# Patient Record
Sex: MALE | Race: BLACK OR AFRICAN AMERICAN | Employment: STUDENT | URBAN - METROPOLITAN AREA
[De-identification: names, ages, dates, MRNs, and addresses within clinical notes are randomized per-mention and may not be internally consistent; named-entity substitution may affect disease eponyms.]

---

## 2018-06-15 ENCOUNTER — HOSPITAL ENCOUNTER (OUTPATIENT)
Dept: LAB | Age: 38
Discharge: HOME | End: 2018-06-15
Payer: COMMERCIAL

## 2018-06-15 ENCOUNTER — OFFICE VISIT (OUTPATIENT)
Dept: FAMILY MEDICINE CLINIC | Age: 38
End: 2018-06-15

## 2018-06-15 VITALS
WEIGHT: 128.4 LBS | HEART RATE: 94 BPM | RESPIRATION RATE: 18 BRPM | OXYGEN SATURATION: 97 % | BODY MASS INDEX: 20.63 KG/M2 | DIASTOLIC BLOOD PRESSURE: 65 MMHG | HEIGHT: 66 IN | SYSTOLIC BLOOD PRESSURE: 129 MMHG | TEMPERATURE: 97.7 F

## 2018-06-15 DIAGNOSIS — Z13.0 SCREENING FOR DEFICIENCY ANEMIA: ICD-10-CM

## 2018-06-15 DIAGNOSIS — J30.1 NON-SEASONAL ALLERGIC RHINITIS DUE TO POLLEN: ICD-10-CM

## 2018-06-15 DIAGNOSIS — Z13.29 SCREENING FOR THYROID DISORDER: ICD-10-CM

## 2018-06-15 DIAGNOSIS — Z13.21 ENCOUNTER FOR VITAMIN DEFICIENCY SCREENING: ICD-10-CM

## 2018-06-15 DIAGNOSIS — G57.32 NEUROPATHY OF LEFT PERONEAL NERVE: ICD-10-CM

## 2018-06-15 DIAGNOSIS — M79.2 NEUROPATHIC PAIN, LEG, LEFT: ICD-10-CM

## 2018-06-15 DIAGNOSIS — Z00.00 ROUTINE ADULT HEALTH MAINTENANCE: ICD-10-CM

## 2018-06-15 DIAGNOSIS — Z00.00 ROUTINE ADULT HEALTH MAINTENANCE: Primary | ICD-10-CM

## 2018-06-15 DIAGNOSIS — M21.372 FOOT DROP, LEFT: ICD-10-CM

## 2018-06-15 LAB
25(OH)D3 SERPL-MCNC: 11.4 NG/ML (ref 30–100)
ALBUMIN SERPL-MCNC: 4.1 G/DL (ref 3.4–5)
ALBUMIN/GLOB SERPL: 1.3 {RATIO} (ref 0.8–1.7)
ALP SERPL-CCNC: 69 U/L (ref 45–117)
ALT SERPL-CCNC: 25 U/L (ref 16–61)
ANION GAP SERPL CALC-SCNC: 8 MMOL/L (ref 3–18)
AST SERPL-CCNC: 24 U/L (ref 15–37)
BASOPHILS # BLD: 0 K/UL (ref 0–0.06)
BASOPHILS NFR BLD: 1 % (ref 0–2)
BILIRUB SERPL-MCNC: 0.9 MG/DL (ref 0.2–1)
BUN SERPL-MCNC: 12 MG/DL (ref 7–18)
BUN/CREAT SERPL: 15 (ref 12–20)
CALCIUM SERPL-MCNC: 8.8 MG/DL (ref 8.5–10.1)
CHLORIDE SERPL-SCNC: 104 MMOL/L (ref 100–108)
CO2 SERPL-SCNC: 29 MMOL/L (ref 21–32)
CREAT SERPL-MCNC: 0.8 MG/DL (ref 0.6–1.3)
DIFFERENTIAL METHOD BLD: ABNORMAL
EOSINOPHIL # BLD: 0.1 K/UL (ref 0–0.4)
EOSINOPHIL NFR BLD: 3 % (ref 0–5)
ERYTHROCYTE [DISTWIDTH] IN BLOOD BY AUTOMATED COUNT: 11.6 % (ref 11.6–14.5)
EST. AVERAGE GLUCOSE BLD GHB EST-MCNC: 108 MG/DL
FERRITIN SERPL-MCNC: 97 NG/ML (ref 8–388)
FOLATE SERPL-MCNC: >20 NG/ML (ref 3.1–17.5)
GLOBULIN SER CALC-MCNC: 3.1 G/DL (ref 2–4)
GLUCOSE SERPL-MCNC: 92 MG/DL (ref 74–99)
HBA1C MFR BLD: 5.4 % (ref 4.2–5.6)
HCT VFR BLD AUTO: 43.7 % (ref 36–48)
HGB BLD-MCNC: 15.1 G/DL (ref 13–16)
LYMPHOCYTES # BLD: 1.9 K/UL (ref 0.9–3.6)
LYMPHOCYTES NFR BLD: 42 % (ref 21–52)
MCH RBC QN AUTO: 31.5 PG (ref 24–34)
MCHC RBC AUTO-ENTMCNC: 34.6 G/DL (ref 31–37)
MCV RBC AUTO: 91.2 FL (ref 74–97)
MONOCYTES # BLD: 0.3 K/UL (ref 0.05–1.2)
MONOCYTES NFR BLD: 6 % (ref 3–10)
NEUTS SEG # BLD: 2.2 K/UL (ref 1.8–8)
NEUTS SEG NFR BLD: 48 % (ref 40–73)
PLATELET # BLD AUTO: 238 K/UL (ref 135–420)
PMV BLD AUTO: 11.3 FL (ref 9.2–11.8)
POTASSIUM SERPL-SCNC: 4.1 MMOL/L (ref 3.5–5.5)
PROT SERPL-MCNC: 7.2 G/DL (ref 6.4–8.2)
RBC # BLD AUTO: 4.79 M/UL (ref 4.7–5.5)
SODIUM SERPL-SCNC: 141 MMOL/L (ref 136–145)
T4 FREE SERPL-MCNC: 0.7 NG/DL (ref 0.7–1.5)
TSH SERPL DL<=0.05 MIU/L-ACNC: 0.4 UIU/ML (ref 0.36–3.74)
VIT B12 SERPL-MCNC: 638 PG/ML (ref 211–911)
WBC # BLD AUTO: 4.5 K/UL (ref 4.6–13.2)

## 2018-06-15 PROCEDURE — 83036 HEMOGLOBIN GLYCOSYLATED A1C: CPT | Performed by: FAMILY MEDICINE

## 2018-06-15 PROCEDURE — 80053 COMPREHEN METABOLIC PANEL: CPT | Performed by: FAMILY MEDICINE

## 2018-06-15 PROCEDURE — 36415 COLL VENOUS BLD VENIPUNCTURE: CPT | Performed by: FAMILY MEDICINE

## 2018-06-15 PROCEDURE — 82728 ASSAY OF FERRITIN: CPT | Performed by: FAMILY MEDICINE

## 2018-06-15 PROCEDURE — 85025 COMPLETE CBC W/AUTO DIFF WBC: CPT | Performed by: FAMILY MEDICINE

## 2018-06-15 PROCEDURE — 82607 VITAMIN B-12: CPT | Performed by: FAMILY MEDICINE

## 2018-06-15 PROCEDURE — 82306 VITAMIN D 25 HYDROXY: CPT | Performed by: FAMILY MEDICINE

## 2018-06-15 PROCEDURE — 84439 ASSAY OF FREE THYROXINE: CPT | Performed by: FAMILY MEDICINE

## 2018-06-15 PROCEDURE — 84443 ASSAY THYROID STIM HORMONE: CPT | Performed by: FAMILY MEDICINE

## 2018-06-15 RX ORDER — FEXOFENADINE HCL 60 MG
60 TABLET ORAL
Qty: 90 TAB | Refills: 1 | Status: SHIPPED | OUTPATIENT
Start: 2018-06-15 | End: 2019-01-25 | Stop reason: SDUPTHER

## 2018-06-15 RX ORDER — FLUTICASONE PROPIONATE 50 MCG
2 SPRAY, SUSPENSION (ML) NASAL DAILY
Qty: 1 BOTTLE | Refills: 1 | Status: SHIPPED | OUTPATIENT
Start: 2018-06-15

## 2018-06-15 RX ORDER — GABAPENTIN 300 MG/1
900 CAPSULE ORAL 3 TIMES DAILY
Qty: 270 CAP | Refills: 3 | Status: SHIPPED | OUTPATIENT
Start: 2018-06-15 | End: 2019-01-25 | Stop reason: SDUPTHER

## 2018-06-15 NOTE — PROGRESS NOTES
Sara Bingham is a 40 y.o. male (: 1980) presenting to address:    Chief Complaint   Patient presents with    Knee Pain     left knee     Leg Pain     left leg        Vitals:    06/15/18 1029   BP: 129/65   Pulse: 94   Resp: 18   Temp: 97.7 °F (36.5 °C)   TempSrc: Oral   SpO2: 97%   Weight: 128 lb 6.4 oz (58.2 kg)   Height: 5' 6\" (1.676 m)   PainSc:   7   PainLoc: Leg       Hearing/Vision:   No exam data present    Learning Assessment:     Learning Assessment 2013   PRIMARY LEARNER Patient   HIGHEST LEVEL OF EDUCATION - PRIMARY LEARNER  SOME COLLEGE   BARRIERS PRIMARY LEARNER NONE   PRIMARY LANGUAGE ENGLISH    NEED No   LEARNER PREFERENCE PRIMARY VIDEOS     LISTENING   ANSWERED BY Patient   RELATIONSHIP SELF     Depression Screening:     PHQ over the last two weeks 2016   Little interest or pleasure in doing things Nearly every day   Feeling down, depressed or hopeless Nearly every day   Total Score PHQ 2 6   Trouble falling or staying asleep, or sleeping too much Not at all   Feeling tired or having little energy Several days   Poor appetite or overeating Not at all   Feeling bad about yourself - or that you are a failure or have let yourself or your family down Not at all   Trouble concentrating on things such as school, work, reading or watching TV Not at all   Moving or speaking so slowly that other people could have noticed; or the opposite being so fidgety that others notice Not at all   Thoughts of being better off dead, or hurting yourself in some way Not at all   PHQ 9 Score 7   How difficult have these problems made it for you to do your work, take care of your home and get along with others Not difficult at all     Fall Risk Assessment:   No flowsheet data found. Abuse Screening:     Abuse Screening Questionnaire 2015   Do you ever feel afraid of your partner? N   Are you in a relationship with someone who physically or mentally threatens you?  N   Is it safe for you to go home? Y     Coordination of Care Questionaire:   1. Have you been to the ER, urgent care clinic since your last visit? Hospitalized since your last visit? YES Keysha [place] 11/9/17 Tongue Swelling; [facility] 5/25/18 Arm pain    2. Have you seen or consulted any other health care providers outside of the [address] [place] [address] since your last visit? Include any pap smears or colon screening. NO    Advanced Directive:   1. Do you have an Advanced Directive? NO    2. Would you like information on Advanced Directives?  NO

## 2018-06-15 NOTE — PROGRESS NOTES
3 [facility]  Primary Care Office Visit - Problem-Oriented    : 1980   Nba Linares is a 40 y.o. male presenting for  Chief Complaint   Patient presents with    Knee Pain     left knee     Leg Pain     left leg        Assessment/Plan:     1. Routine adult health maintenance  - HEMOGLOBIN A1C WITH EAG; Future  - CBC WITH AUTOMATED DIFF; Future  - METABOLIC PANEL, COMPREHENSIVE; Future  - T4, FREE; Future  - TSH 3RD GENERATION; Future  - VITAMIN D, 25 HYDROXY; Future    2. Screening for deficiency anemia  - HEMOGLOBIN A1C WITH EAG; Future  - VITAMIN B12 & FOLATE; Future  - FERRITIN; Future    3. Screening for thyroid disorder  - T4, FREE; Future  - TSH 3RD GENERATION; Future    4. Encounter for vitamin deficiency screening  - VITAMIN D, 25 HYDROXY; Future  - VITAMIN B12 & FOLATE; Future  - FERRITIN; Future    5. Neuropathic pain, leg, left  6. Neuropathy of left peroneal nerve  7. Foot drop, left  Chronic, ongoing. Restart gabapentin.  - gabapentin (NEURONTIN) 300 mg capsule; Take 3 Caps by mouth three (3) times daily. Dispense: 270 Cap; Refill: 3  - METABOLIC PANEL, COMPREHENSIVE; Future  - T4, FREE; Future  - TSH 3RD GENERATION; Future  - VITAMIN D, 25 HYDROXY; Future  - VITAMIN B12 & FOLATE; Future  - FERRITIN; Future    8. Non-seasonal allergic rhinitis due to pollen  New issue. Start nasal spray & antihistamine.  - fluticasone (FLONASE) 50 mcg/actuation nasal spray; 2 Sprays by Both Nostrils route daily. Dispense: 1 Bottle; Refill: 1  - fexofenadine (ALLEGRA) 60 mg tablet; Take 1 Tab by mouth daily as needed for Allergies. Dispense: 90 Tab; Refill: 1    Orders & Diagnoses associated with This Encounter:         ICD-10-CM ICD-9-CM   1. Routine adult health maintenance Z00.00 V70.0   2. Neuropathic pain, leg, left G57.92 355.8   3. Screening for deficiency anemia Z13.0 V78.1   4. Screening for thyroid disorder Z13.29 V77.0   5.  Encounter for vitamin deficiency screening Z13.21 V77. 99   6. Non-seasonal allergic rhinitis due to pollen J30.1 477.0       Orders Placed This Encounter    HEMOGLOBIN A1C WITH EAG     Standing Status:   Future     Number of Occurrences:   1     Standing Expiration Date:   2019    CBC WITH AUTOMATED DIFF     Standing Status:   Future     Number of Occurrences:   1     Standing Expiration Date:   3/44/6745    METABOLIC PANEL, COMPREHENSIVE     Standing Status:   Future     Number of Occurrences:   1     Standing Expiration Date:   2019    T4, FREE     Standing Status:   Future     Number of Occurrences:   1     Standing Expiration Date:   2019    TSH 3RD GENERATION     Standing Status:   Future     Number of Occurrences:   1     Standing Expiration Date:   2019    VITAMIN D, 25 HYDROXY     Standing Status:   Future     Number of Occurrences:   1     Standing Expiration Date:   2019    VITAMIN B12 & FOLATE     Standing Status:   Future     Number of Occurrences:   1     Standing Expiration Date:   2019    FERRITIN     Standing Status:   Future     Number of Occurrences:   1     Standing Expiration Date:   2019    gabapentin (NEURONTIN) 300 mg capsule     Sig: Take 3 Caps by mouth three (3) times daily. Dispense:  270 Cap     Refill:  3    fluticasone (FLONASE) 50 mcg/actuation nasal spray     Si Sprays by Both Nostrils route daily. Dispense:  1 Bottle     Refill:  1    fexofenadine (ALLEGRA) 60 mg tablet     Sig: Take 1 Tab by mouth daily as needed for Allergies. Dispense:  90 Tab     Refill:  1         This document may have been created with the aid of dictation software. Text may contain errors, particularly phonetic errors. Reviewed management plan & instructions with patient, who voiced understanding.     Kasey Daly MD  Internal Medicine, Family Medicine & Sports Medicine  6/15/2018, 10:46 AM      History:   Natasha Alvarado is a 40 y.o. male presenting to address:  Chief Complaint   Patient presents with    Knee Pain     left knee     Leg Pain     left leg        Working for [employer] since 2016. Calling off 1-2x/month 2/2 uncontrolled LLE pain associated with his previous injury. Wears his AFO about 3x/month. In general, plays the drums on Sunday, which does help with his neuromuscular rehabilitation, but occasionally leaves his pain uncontrolled the next day. Also reports sneezing, runny nose, resulting in non-productive cough. Past Medical History:   Diagnosis Date    Asthma      Past Surgical History:   Procedure Laterality Date    HX ORTHOPAEDIC  July 2013    ORIF L fibular head fracture      reports that he has never smoked. He has never used smokeless tobacco. He reports that he drinks about 3.0 oz of alcohol per week  He reports that he uses illicit drugs, including Marijuana. Social History     Social History Narrative    LHD    . 2 children. 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    (1/22/2015)     History   Smoking Status    Never Smoker   Smokeless Tobacco    Never Used     Family History   Problem Relation Age of Onset    Diabetes Mother     Cancer Maternal Grandmother      breast    Cancer Maternal Grandfather      prostate     Allergies   Allergen Reactions    Pcn [Penicillins] Other (comments)       Problem List:      Patient Active Problem List    Diagnosis    Vitamin D deficiency     - VitD 6.3 (L) (2/6/2016) -> 50k 2x/wk x12wks, then 4000 units OTC daily      Prediabetes     - A1c 5.8 (Feb 2016)      Routine health maintenance    Neuropathy of left peroneal nerve     - 2/25/2015 [[facility]]: schedule LLE NCS  - 1/28/2015 [[facility] PM&R]: butrans patch 10mcg weekly; continue gabapentin 1200mg TID & elavil 50mg qhs; start NMES unit for left leg atrophy    - EMG (12/2013): severe profound peroneal neuropathy at the level of the fibular head (short head of the biceps was spared).   The lack of reinnervation potentials at this point portends a poor prognosis.  Narcotic abuse     Per ER notes, using narcotic that he obtained off the streets      Foot drop, left    Fracture, fibula, proximal       Medications:     Current Outpatient Prescriptions   Medication Sig    VITAMIN D2 50,000 unit capsule TAKE 1 CAPSULE BY MOUTH EVERY TUESDAY AND FRIDAY    gabapentin (NEURONTIN) 300 mg capsule Take 3 Caps by mouth three (3) times daily.  HYDROcodone-acetaminophen (NORCO) 7.5-325 mg per tablet Take 1 Tab by mouth every four (4) hours as needed. No current facility-administered medications for this visit. Review of Systems:     Review of Systems   Constitutional: Negative for chills and fever. HENT: Positive for congestion. Negative for ear pain and sore throat. Respiratory: Positive for cough. Negative for shortness of breath. Cardiovascular: Negative for chest pain and palpitations. Gastrointestinal: Negative for abdominal pain. Genitourinary: Negative for dysuria. Musculoskeletal: Negative for myalgias. Skin: Negative for rash. Neurological: Positive for tingling, sensory change and focal weakness. Negative for speech change and headaches. Endo/Heme/Allergies: Does not bruise/bleed easily. Psychiatric/Behavioral: Negative for depression. The patient is not nervous/anxious and does not have insomnia. Physical Assessment:   VS:    Vitals:    06/15/18 1029   BP: 129/65   Pulse: 94   Resp: 18   Temp: 97.7 °F (36.5 °C)   TempSrc: Oral   SpO2: 97%   Weight: 128 lb 6.4 oz (58.2 kg)   Height: 5' 6\" (1.676 m)   PainSc:   7   PainLoc: Leg       Physical Exam   Constitutional: He is oriented to person, place, and time. He appears well-developed and well-nourished. No distress. HENT:   Head: Normocephalic and atraumatic. Right Ear: External ear normal.   Left Ear: External ear normal.   Mouth/Throat: Oropharynx is clear and moist.   Eyes: EOM are normal. Pupils are equal, round, and reactive to light.    Neck: Normal range of motion. Neck supple. Cardiovascular: Normal rate, regular rhythm and normal heart sounds. No murmur heard. Pulmonary/Chest: Effort normal and breath sounds normal. No respiratory distress. He has no wheezes. Musculoskeletal: Normal range of motion. Neurological: He is alert and oriented to person, place, and time. He displays atrophy. A sensory deficit is present. No cranial nerve deficit.   (+) left peroneal hypesthesia below knee  4/5 L ankle dorsiflexion with pain   Skin: Skin is warm and dry. He is not diaphoretic. Psychiatric: He has a normal mood and affect. His behavior is normal. Judgment and thought content normal.   Nursing note reviewed.

## 2018-06-24 DIAGNOSIS — E55.9 VITAMIN D DEFICIENCY: Primary | ICD-10-CM

## 2018-06-24 RX ORDER — ERGOCALCIFEROL 1.25 MG/1
50000 CAPSULE ORAL
Qty: 24 CAP | Refills: 0 | Status: SHIPPED | OUTPATIENT
Start: 2018-06-26 | End: 2019-04-18 | Stop reason: SDUPTHER

## 2018-06-24 NOTE — PATIENT INSTRUCTIONS
Neuropathic Pain: Care Instructions  Your Care Instructions    Neuropathic pain is caused by pressure on or damage to your nerves. It's often simply called nerve pain. Some people feel this type of pain all the time. For others, it comes and goes. Diabetes, shingles, or an injury can cause nerve pain. Many people say the pain feels sharp, burning, or stabbing. But some people feel it as a dull ache. In some cases, it makes your skin very sensitive. So touch, pressure, and other sensations that did not hurt before may now cause pain. It's important to know that this kind of pain is real and can affect your quality of life. It's also important to know that treatment can help. Treatment includes pain medicines, exercise, and physical therapy. Medicines can help reduce the number of pain signals that travel over the nerves. This can make the painful areas less sensitive. It can also help you sleep better and improve your mood. But medicines are only one part of successful treatment. Most people do best with more than one kind of treatment. Your doctor may recommend that you try cognitive-behavioral therapy and stress management. Or, if needed, you may decide to try to quit smoking, lower your blood pressure, or better control blood sugar. These kinds of healthy changes can also make a difference. If you feel that your treatment is not working, talk to your doctor. And be sure to tell your doctor if you think you might be depressed or anxious. These are common problems that can also be treated. Follow-up care is a key part of your treatment and safety. Be sure to make and go to all appointments, and call your doctor if you are having problems. It's also a good idea to know your test results and keep a list of the medicines you take. How can you care for yourself at home? · Be safe with medicines. Read and follow all instructions on the label.   ¨ If the doctor gave you a prescription medicine for pain, take it as prescribed. ¨ If you are not taking a prescription pain medicine, ask your doctor if you can take an over-the-counter medicine. · Save hard tasks for days when you have less pain. Follow a hard task with an easy task. And remember to take breaks. · Relax, and reduce stress. You may want to try deep breathing or meditation. These can help. · Keep moving. Gentle, daily exercise can help reduce pain. Your doctor or physical therapist can tell you what type of exercise is best for you. This may include walking, swimming, and stationary biking. It may also include stretches and range-of-motion exercises. · Try heat, cold packs, and massage. · Get enough sleep. Constant pain can make you more tired. If the pain makes it hard to sleep, talk with your doctor. · Think positively. Your thoughts can affect your pain. Do fun things to distract yourself from the pain. See a movie, read a book, listen to music, or spend time with a friend. · Keep a pain diary. Try to write down how strong your pain is and what it feels like. Also try to notice and write down how your moods, thoughts, sleep, activities, and medicine affect your pain. These notes can help you and your doctor find the best ways to treat your pain. Reducing constipation caused by pain medicine  Pain medicines often cause constipation. To reduce constipation:  · Include fruits, vegetables, beans, and whole grains in your diet each day. These foods are high in fiber. · Drink plenty of fluids, enough so that your urine is light yellow or clear like water. If you have kidney, heart, or liver disease and have to limit fluids, talk with your doctor before you increase the amount of fluids you drink. · Get some exercise every day. Build up slowly to 30 to 60 minutes a day on 5 or more days of the week. · Take a fiber supplement, such as Citrucel or Metamucil, every day if needed. Read and follow all instructions on the label.   · Schedule time each day for a bowel movement. Having a daily routine may help. Take your time and do not strain when having a bowel movement. · Ask your doctor about a laxative. The goal is to have one easy bowel movement every 1 to 2 days. Do not let constipation go untreated for more than 3 days. When should you call for help? Call your doctor now or seek immediate medical care if:  ? · You feel sad, anxious, or hopeless for more than a few days. This could mean you are depressed. Depression is common in people who have a lot of pain. But it can be treated. ? · You have trouble with bowel movements, such as:  ¨ No bowel movement in 3 days. ¨ Blood in the anal area, in your stool, or on the toilet paper. ¨ Diarrhea for more than 24 hours. ? Watch closely for changes in your health, and be sure to contact your doctor if:  ? · Your pain is getting worse. ? · You can't sleep because of pain. ? · You are very worried or anxious about your pain. ? · You have trouble taking your pain medicine. ? · You have any concerns about your pain medicine or its side effects. ? · You have vomiting or cramps for more than 2 hours. Where can you learn more? Go to http://tona-jose.info/. Enter H492 in the search box to learn more about \"Neuropathic Pain: Care Instructions. \"  Current as of: October 14, 2016  Content Version: 11.4  © 8475-5331 [employer]. Care instructions adapted under license by [employer] (which disclaims liability or warranty for this information). If you have questions about a medical condition or this instruction, always ask your healthcare professional. [address] any warranty or liability for your use of this information.

## 2018-06-25 NOTE — PROGRESS NOTES
Released to [facility] with the following msg:  ------  Your vitamin D level is quite low. This can cause fatigue & muscle pain. In order to restore your level to normal, I have sent high-dose prescription strength Vitamin D to your pharmacy, to be taken 2 times a week for 3 months. Once you are done with the 3mo prescription, you should start taking at least 2000 units of VitD3 over-the-counter on a daily basis to maintain your levels. Please let me know if you have any questions / concerns.

## 2019-01-14 ENCOUNTER — OFFICE VISIT (OUTPATIENT)
Dept: FAMILY MEDICINE CLINIC | Age: 39
End: 2019-01-14

## 2019-01-14 ENCOUNTER — HOSPITAL ENCOUNTER (OUTPATIENT)
Dept: LAB | Age: 39
Discharge: HOME | End: 2019-01-14
Payer: COMMERCIAL

## 2019-01-14 VITALS
DIASTOLIC BLOOD PRESSURE: 80 MMHG | OXYGEN SATURATION: 98 % | SYSTOLIC BLOOD PRESSURE: 118 MMHG | HEIGHT: 66 IN | BODY MASS INDEX: 22.24 KG/M2 | TEMPERATURE: 98.4 F | HEART RATE: 87 BPM | RESPIRATION RATE: 16 BRPM | WEIGHT: 138.4 LBS

## 2019-01-14 DIAGNOSIS — R36.0 PENILE DISCHARGE, WITHOUT BLOOD: ICD-10-CM

## 2019-01-14 DIAGNOSIS — R36.0 PENILE DISCHARGE, WITHOUT BLOOD: Primary | ICD-10-CM

## 2019-01-14 LAB
APPEARANCE UR: CLEAR
BACTERIA URNS QL MICRO: NEGATIVE /HPF
BILIRUB UR QL: NEGATIVE
COLOR UR: YELLOW
EPITH CASTS URNS QL MICRO: NORMAL /LPF (ref 0–5)
GLUCOSE UR STRIP.AUTO-MCNC: NEGATIVE MG/DL
HGB UR QL STRIP: NEGATIVE
KETONES UR QL STRIP.AUTO: NEGATIVE MG/DL
LEUKOCYTE ESTERASE UR QL STRIP.AUTO: ABNORMAL
NITRITE UR QL STRIP.AUTO: NEGATIVE
PH UR STRIP: 6.5 [PH] (ref 5–8)
PROT UR STRIP-MCNC: NEGATIVE MG/DL
RBC #/AREA URNS HPF: 0 /HPF (ref 0–5)
SP GR UR REFRACTOMETRY: 1 (ref 1–1.03)
UROBILINOGEN UR QL STRIP.AUTO: 0.2 EU/DL (ref 0.2–1)
WBC URNS QL MICRO: NORMAL /HPF (ref 0–4)

## 2019-01-14 PROCEDURE — 81001 URINALYSIS AUTO W/SCOPE: CPT

## 2019-01-14 PROCEDURE — 87491 CHLMYD TRACH DNA AMP PROBE: CPT

## 2019-01-14 PROCEDURE — 87086 URINE CULTURE/COLONY COUNT: CPT

## 2019-01-14 NOTE — PROGRESS NOTES
HISTORY OF PRESENT ILLNESS Kevin Kurtz is a 41 y.o. male. Penile Discharge The history is provided by the patient and medical records. This is a new problem. Episode onset: about a week ago. Pertinent negatives include no abdominal pain. Patient Active Problem List  
Diagnosis Code  Foot drop, left M21.372  Fracture, fibula, proximal X22.425Y  Neuropathy of left peroneal nerve G57.32  
 Routine health maintenance Z00.00  Vitamin D deficiency E55.9  Prediabetes R73.03  
 
 
Current Outpatient Medications:  
  gabapentin (NEURONTIN) 300 mg capsule, Take 3 Caps by mouth three (3) times daily. , Disp: 270 Cap, Rfl: 3 
  fluticasone (FLONASE) 50 mcg/actuation nasal spray, 2 Sprays by Both Nostrils route daily. , Disp: 1 Bottle, Rfl: 1 
  fexofenadine (ALLEGRA) 60 mg tablet, Take 1 Tab by mouth daily as needed for Allergies. , Disp: 90 Tab, Rfl: 1 Allergies Allergen Reactions  Pcn [Penicillins] Other (comments) Review of Systems Constitutional: Negative for chills and fever. Gastrointestinal: Negative for abdominal pain. Genitourinary: Negative for dysuria, flank pain, frequency, hematuria and urgency. Some itching with urination Small amount of discharge at urethral OS No staining on shorts Visit Vitals /80 (BP 1 Location: Left arm, BP Patient Position: Sitting) Pulse 87 Temp 98.4 °F (36.9 °C) (Oral) Resp 16 Ht 5' 6\" (1.676 m) Wt 138 lb 6.4 oz (62.8 kg) SpO2 98% BMI 22.34 kg/m² Physical Exam  
Constitutional: He is oriented to person, place, and time. He appears well-developed and well-nourished. HENT:  
Head: Normocephalic. Eyes: EOM are normal.  
Neck: Neck supple. Cardiovascular: Normal rate, regular rhythm and normal heart sounds. Pulmonary/Chest: Effort normal and breath sounds normal.  
Genitourinary: Penis normal.  
Musculoskeletal: He exhibits no edema. Neurological: He is alert and oriented to person, place, and time. Skin: Skin is warm and dry. Psychiatric: He has a normal mood and affect. His behavior is normal.  
Nursing note and vitals reviewed. ASSESSMENT and PLAN 
  ICD-10-CM ICD-9-CM 1. Penile discharge, without blood R36.9 788.7 URINALYSIS W/ RFLX MICROSCOPIC  
   CULTURE, URINE  
   CHLAMYDIA/NEISSERIA AMPLIFICATION Further disposition pending lab results if indicated.

## 2019-01-14 NOTE — PATIENT INSTRUCTIONS
Further disposition pending lab results if indicated. Gonorrhea and Chlamydia: About These Tests What is it? You can have a test to find out if you have gonorrhea or chlamydia. This kind of test checks for the bacteria that cause these sexually transmitted infections (STIs). There are different ways to test for the bacteria. Most tests use either a urine sample or a sample of body fluid. A fluid sample can come from inside the tip of the penis or from inside the rectum or vagina. Why is this test done? These tests may be done to: · Find out if your symptoms are caused by gonorrhea or chlamydia. · Find out if you have one of these infections even though you don't have symptoms. How can you prepare for the test? 
· If you are a woman, do not douche or use vaginal creams or medicines for at least 24 hours before the test. 
· If you are going to have a urine test, do not urinate for 2 hours before the test. 
What happens during the test? 
· To get a sample from the urethra or rectum, the doctor will put a small swab into the tip of the penis or inside the rectum. · To get a sample from the vagina, the doctor will first put a speculum into the vagina. A speculum is a tool to spread apart the walls of the vagina. Then he or she will use a small swab to get the fluid sample. · For a urine sample, you will collect the urine that comes out when you first start to urinate. Don't wipe the genital area clean before you urinate. What else should you know about the test? 
· If you think you may have chlamydia or gonorrhea, don't have sexual intercourse until you get your test results. And you may want to have tests for other STIs, such as HIV. · If you do have an infection, don't have sexual intercourse for 7 days after you start treatment. · If you have an infection, your sex partner(s) should also be treated. How long does the test take? · The test will take a few minutes.  
What happens after the test? 
 · You will be able to go home right away. · You can go back to your usual activities right away. Follow-up care is a key part of your treatment and safety. Be sure to make and go to all appointments, and call your doctor if you are having problems. It's also a good idea to keep a list of the medicines you take. Ask your doctor when you can expect to have your test results. Where can you learn more? Go to http://tona-jose.info/. Enter P028 in the search box to learn more about \"Gonorrhea and Chlamydia: About These Tests. \" Current as of: November 27, 2017 Content Version: 11.8 © 4018-7948 [employer]. Care instructions adapted under license by [employer] (which disclaims liability or warranty for this information). If you have questions about a medical condition or this instruction, always ask your healthcare professional. [address] any warranty or liability for your use of this information. Urine Culture: About This Test 
What is it? A urine culture is a test to find germs (such as bacteria) that can cause an infection. A sample of urine is added to a substance that promotes the growth of germs. If no germs grow, the culture is negative. If germs that can cause infection grow, the culture is positive. The type of germ may be identified using a microscope or chemical tests. Why is this test done? A urine culture may be done to: · Find the cause of a urinary tract infection (UTI). · Make decisions about the best treatment for a UTI. · Find out whether treatment for a UTI worked. How can you prepare for the test? 
You don't need to do anything before you have the test. If you are taking or have recently taken antibiotics, tell your doctor. What happens before the test? 
You will need to drink enough fluids and avoid urinating so that you will be able to collect a urine sample.  
What happens during the test? 
 You will be asked to collect a clean-catch midstream urine sample for testing. The first urine of the day is best because bacteria levels will be higher. · Wash your hands before collecting the urine. · If the container has a lid, remove the lid of the container and set it down with the inner surface up. · Clean the area around your penis or vagina. · Begin urinating into the toilet or urinal. 
· After the urine has flowed for several seconds, place the collection container in the stream and collect about 2 ounces (a quarter cup) of this \"midstream\" urine without stopping the flow. · Don't touch the rim of the container to your genital area. · Finish urinating into the toilet or urinal. 
· Carefully replace the lid on the container. · Wash your hands. How long does the test take? · The test will take a few minutes. What happens after the test? 
· You will probably be able to go home right away. The results of a urine culture are usually available in 1 to 3 days. · You can go back to your usual activities right away. Follow-up care is a key part of your treatment and safety. Be sure to make and go to all appointments, and call your doctor if you are having problems. It's also a good idea to keep a list of the medicines you take. Ask your doctor when you can expect to have your test results. Where can you learn more? Go to http://tona-jose.info/. Enter Q960 in the search box to learn more about \"Urine Culture: About This Test.\" Current as of: June 26, 2018 Content Version: 11.8 © 7574-2683 [employer]. Care instructions adapted under license by [employer] (which disclaims liability or warranty for this information). If you have questions about a medical condition or this instruction, always ask your healthcare professional. [address] any warranty or liability for your use of this information.

## 2019-01-14 NOTE — PROGRESS NOTES
Eleno Ibrahim is a 41 y.o. male (: 1980) presenting to address: Chief Complaint Patient presents with  
 Other Here for burning and itching in private area x 1 week. Also need medication refill. There were no vitals filed for this visit. Hearing/Vision: No exam data present Learning Assessment:  
 
Learning Assessment 2013 PRIMARY LEARNER Patient HIGHEST LEVEL OF EDUCATION - PRIMARY LEARNER  SOME COLLEGE  
BARRIERS PRIMARY LEARNER NONE PRIMARY LANGUAGE ENGLISH  NEED No  
LEARNER PREFERENCE PRIMARY VIDEOS  
  LISTENING  
ANSWERED BY Patient RELATIONSHIP SELF Depression Screening: PHQ over the last two weeks 2019 Little interest or pleasure in doing things Not at all Feeling down, depressed, irritable, or hopeless Not at all Total Score PHQ 2 0 Trouble falling or staying asleep, or sleeping too much - Feeling tired or having little energy - Poor appetite, weight loss, or overeating - Feeling bad about yourself - or that you are a failure or have let yourself or your family down - Trouble concentrating on things such as school, work, reading, or watching TV - Moving or speaking so slowly that other people could have noticed; or the opposite being so fidgety that others notice - Thoughts of being better off dead, or hurting yourself in some way -  
PHQ 9 Score - How difficult have these problems made it for you to do your work, take care of your home and get along with others - Fall Risk Assessment:  
No flowsheet data found. Abuse Screening:  
 
Abuse Screening Questionnaire 2015 Do you ever feel afraid of your partner? Tyrell Moore Are you in a relationship with someone who physically or mentally threatens you? Tyrell Moore Is it safe for you to go home? Tania Solomon Coordination of Care Questionaire: 1. Have you been to the ER, urgent care clinic since your last visit? Hospitalized since your last visit?  NO 
 
 2. Have you seen or consulted any other health care providers outside of the [facility] since your last visit? Include any pap smears or colon screening. NO Advanced Directive: 1. Do you have an Advanced Directive? NO 
 
2. Would you like information on Advanced Directives?  NO

## 2019-01-15 LAB
C TRACH RRNA SPEC QL NAA+PROBE: POSITIVE
N GONORRHOEA RRNA SPEC QL NAA+PROBE: POSITIVE
SPECIMEN SOURCE: ABNORMAL

## 2019-01-16 ENCOUNTER — CLINICAL SUPPORT (OUTPATIENT)
Dept: FAMILY MEDICINE CLINIC | Age: 39
End: 2019-01-16

## 2019-01-16 DIAGNOSIS — A74.9 CHLAMYDIA INFECTION: ICD-10-CM

## 2019-01-16 DIAGNOSIS — A54.01 GONOCOCCAL URETHRITIS IN MALE: Primary | ICD-10-CM

## 2019-01-16 LAB
BACTERIA SPEC CULT: NORMAL
SERVICE CMNT-IMP: NORMAL

## 2019-01-16 RX ORDER — CEFTRIAXONE 250 MG/8ML
250 INJECTION, POWDER, FOR SOLUTION INTRAMUSCULAR; INTRAVENOUS ONCE
Qty: 1 VIAL | Refills: 0
Start: 2019-01-16 | End: 2019-01-16

## 2019-01-16 RX ORDER — AZITHROMYCIN 250 MG/1
TABLET, FILM COATED ORAL
Qty: 4 TAB | Refills: 0 | Status: SHIPPED | OUTPATIENT
Start: 2019-01-16 | End: 2019-01-21

## 2019-01-16 NOTE — PROGRESS NOTES
After obtaining consent, and per orders of Dr. Felicita Barriga, injection of Ceftriaxone given by Caryn Lara LPN. Patient instructed to remain in clinic for 20 minutes afterwards, and to report any adverse reaction to me immediately.

## 2019-01-16 NOTE — PROGRESS NOTES
Please advise . Lacie Schumacher that his lab results are positive for chlamydia and gonorrhea. Treatment requires an antibiotic injection and oral antibiotics. Please advise him to return for a nurse visit ASAP for an injection with ceftriaxone and to receive a prescription for the oral antibiotic.

## 2019-01-24 NOTE — PROGRESS NOTES
[facility] Visit - Problem-Oriented : 1980 Viviane Foster is a 41 y.o. male presenting for Chief Complaint Patient presents with  Complete Physical  
 Leg Pain  
  left leg pain Assessment/Plan: 1. Neuropathic pain, leg, left 2. Neuropathy of left peroneal nerve 3. Foot drop, left Ongoing neuropathy, with changes in quality of pain, now both radiating down leg as well as going up leg. Add cymbalta. No change to current gabapentin. Referral for consultation. 
- gabapentin (NEURONTIN) 300 mg capsule; Take 3 Caps by mouth three (3) times daily. Dispense: 270 Cap; Refill: 3 
- DULoxetine (CYMBALTA) 20 mg capsule; Take 1 Cap by mouth daily. Dispense: 30 Cap; Refill: 2 
- REFERRAL TO NEUROLOGY 4. Non-seasonal allergic rhinitis due to pollen Initial encounter. Start antihistamine. 
- fexofenadine (ALLEGRA) 60 mg tablet; Take 1 Tab by mouth daily as needed for Allergies. Dispense: 90 Tab; Refill: 1 5. Penile discharge Notes resolution s/p tx for GC and Chl. 
Desires retesting to ensure cure. 
- CT/NG/T.VAGINALIS AMPLIFICATION; Future Orders & Diagnoses associated with This Encounter: ICD-10-CM ICD-9-CM 1. Neuropathic pain, leg, left G57.92 355.8 2. Neuropathy of left peroneal nerve G57.32 355.3 3. Foot drop, left M21.372 736.79  
4. Non-seasonal allergic rhinitis due to pollen J30.1 477.0 5. Penile discharge R36.9 788.7 Orders Placed This Encounter  
 CT/NG/T.VAGINALIS AMPLIFICATION Standing Status:   Future Number of Occurrences:   1 Standing Expiration Date:   2020 Order Specific Question:   Specimen type Answer:   Urine, random [581]  REFERRAL TO NEUROLOGY Referral Priority:   Routine Referral Type:   Consultation Referral Reason:   Specialty Services Required Referred to Provider:   Samuel Zhou DO  
  Number of Visits Requested:   1  gabapentin (NEURONTIN) 300 mg capsule Sig: Take 3 Caps by mouth three (3) times daily. Dispense:  270 Cap Refill:  3  
 fexofenadine (ALLEGRA) 60 mg tablet Sig: Take 1 Tab by mouth daily as needed for Allergies. Dispense:  90 Tab Refill:  1  DULoxetine (CYMBALTA) 20 mg capsule Sig: Take 1 Cap by mouth daily. Dispense:  30 Cap Refill:  2 This document may have been created with the aid of dictation software. Text may contain errors, particularly phonetic errors. Reviewed management plan & instructions with patient, who voiced understanding. Sal Serra MD 
Internal Medicine, Family Medicine & Sports Medicine 1/25/2019 Patient Instructions (provided in AVS): To Do: 
· In order to maintain your vitamin D level in the normal range,  I recommend that you take at least 6000 units of Vitamin D3 over-the-counter on a daily basis. ·  to try to get a better control of your nerve pain, add the cymbalta 20mg daily to your regimen. Try taking this at night before bed since sometimes it can make people sleepy in the beginning, but as you take it regularly, it gets better. · Be sure to take your gabapentin 900mg 3 times a day on a regular basis. .. Because it works better when you take it regularly! Notes from your doctor: · I'm going to work on getting you to a neurologist to reassess your nerve function in your left leg, to ensure that we aren't missing anything that could be worsening your pain History:  
Parris Brown is a 41 y.o. male presenting to address: Chief Complaint Patient presents with  Complete Physical  
 Leg Pain  
  left leg pain Still working for [employer]. At times, both legs seem to give out, and has changes in his nerve pain on the left. The pain of LLE not only just going down leg, but also going up leg. Is interested in pursuing further consultation as he would like to improve his QOL. Also states that he did complete the treatment for the gonorrhea/chlamydia for which he was diagnosed during his last visit in this office (with other physician). Notes resolution of  symptoms, and did inform his sexual partners to ensure they were treated. Past Medical History:  
Diagnosis Date  Asthma  Narcotic abuse ([address] 10/13/2014 Per ER notes, using narcotic that he obtained off the streets Past Surgical History:  
Procedure Laterality Date [place] [facility]  July 2013 ORIF L fibular head fracture  
 
 reports that  has never smoked. he has never used smokeless tobacco. He reports that he drinks about 3.0 oz of alcohol per week. He reports that he uses drugs. Drug: Marijuana. Social History Social History Narrative LHD . 2 children. 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  
 (1/22/2015) Social History Tobacco Use Smoking Status Never Smoker Smokeless Tobacco Never Used Family History Problem Relation Age of Onset  Diabetes Mother  Cancer Maternal Grandmother   
     breast  
 Cancer Maternal Grandfather   
     prostate Allergies Allergen Reactions  Pcn [Penicillins] Other (comments) Problem List:  
  
Patient Active Problem List  
 Diagnosis  Penile discharge, without blood  Vitamin D deficiency - VitD 6.3 (L) (2/6/2016) -> 50k 2x/wk x12wks, then 4000 units OTC daily  Prediabetes - A1c 5.8 (Feb 2016)  Routine health maintenance  Neuropathy of left peroneal nerve - 2/25/2015 [[facility]]: schedule LLE NCS 
- 1/28/2015 [[facility] PM&R]: butrans patch 10mcg weekly; continue gabapentin 1200mg TID & elavil 50mg qhs; start NMES unit for left leg atrophy 
 
- EMG (12/2013): severe profound peroneal neuropathy at the level of the fibular head (short head of the biceps was spared). The lack of reinnervation potentials at this point portends a poor prognosis.  Foot drop, left  Fracture, fibula, proximal  
 
 
Medications:  
 
Current Outpatient Medications Medication Sig  
 gabapentin (NEURONTIN) 300 mg capsule Take 3 Caps by mouth three (3) times daily.  fluticasone (FLONASE) 50 mcg/actuation nasal spray 2 Sprays by Both Nostrils route daily.  fexofenadine (ALLEGRA) 60 mg tablet Take 1 Tab by mouth daily as needed for Allergies. No current facility-administered medications for this visit. Review of Systems:  
 
Review of Systems Constitutional: Negative for chills and fever. HENT: Negative for congestion, ear pain and sore throat. Respiratory: Negative for cough and shortness of breath. Cardiovascular: Negative for chest pain and palpitations. Gastrointestinal: Negative for abdominal pain. Genitourinary: Negative for dysuria. Musculoskeletal: Negative for myalgias. Skin: Negative for rash. Neurological: Positive for tingling, sensory change and focal weakness. Negative for speech change and headaches. Endo/Heme/Allergies: Does not bruise/bleed easily. Psychiatric/Behavioral: Negative for depression. The patient is not nervous/anxious and does not have insomnia. Physical Assessment:  
VS:   
Vitals:  
 01/25/19 1041 BP: 124/81 Pulse: 84 Resp: 18 Temp: 97.7 °F (36.5 °C) TempSrc: Oral  
SpO2: 100% Weight: 137 lb 9.6 oz (62.4 kg) Height: 5' 6\" (1.676 m) PainSc:   8 PainLoc: Leg Physical Exam  
Constitutional: He is oriented to person, place, and time. He appears well-developed and well-nourished. No distress. HENT:  
Head: Normocephalic and atraumatic. Right Ear: External ear normal.  
Left Ear: External ear normal.  
Mouth/Throat: Oropharynx is clear and moist.  
Eyes: EOM are normal. Pupils are equal, round, and reactive to light. Neck: Normal range of motion. Neck supple. Cardiovascular: Normal rate, regular rhythm and normal heart sounds. No murmur heard. Pulmonary/Chest: Effort normal and breath sounds normal. No respiratory distress. He has no wheezes. Musculoskeletal: Normal range of motion. Neurological: He is alert and oriented to person, place, and time. He displays atrophy. A sensory deficit is present. No cranial nerve deficit.  
(+) left peroneal hypesthesia below knee 4/5 L ankle dorsiflexion with pain Skin: Skin is warm and dry. He is not diaphoretic. Psychiatric: He has a normal mood and affect. His behavior is normal. Judgment and thought content normal.  
Nursing note reviewed.

## 2019-01-25 ENCOUNTER — OFFICE VISIT (OUTPATIENT)
Dept: FAMILY MEDICINE CLINIC | Age: 39
End: 2019-01-25

## 2019-01-25 VITALS
HEART RATE: 84 BPM | OXYGEN SATURATION: 100 % | TEMPERATURE: 97.7 F | DIASTOLIC BLOOD PRESSURE: 81 MMHG | BODY MASS INDEX: 22.11 KG/M2 | WEIGHT: 137.6 LBS | HEIGHT: 66 IN | RESPIRATION RATE: 18 BRPM | SYSTOLIC BLOOD PRESSURE: 124 MMHG

## 2019-01-25 DIAGNOSIS — G57.32 NEUROPATHY OF LEFT PERONEAL NERVE: ICD-10-CM

## 2019-01-25 DIAGNOSIS — M21.372 FOOT DROP, LEFT: ICD-10-CM

## 2019-01-25 DIAGNOSIS — M79.2 NEUROPATHIC PAIN, LEG, LEFT: Primary | ICD-10-CM

## 2019-01-25 DIAGNOSIS — J30.1 NON-SEASONAL ALLERGIC RHINITIS DUE TO POLLEN: ICD-10-CM

## 2019-01-25 DIAGNOSIS — R36.9 PENILE DISCHARGE: ICD-10-CM

## 2019-01-25 RX ORDER — GABAPENTIN 300 MG/1
900 CAPSULE ORAL 3 TIMES DAILY
Qty: 270 CAP | Refills: 3 | Status: SHIPPED | OUTPATIENT
Start: 2019-01-25 | End: 2019-11-27

## 2019-01-25 RX ORDER — FEXOFENADINE HCL 60 MG
60 TABLET ORAL
Qty: 90 TAB | Refills: 1 | Status: SHIPPED | OUTPATIENT
Start: 2019-01-25

## 2019-01-25 RX ORDER — DULOXETIN HYDROCHLORIDE 20 MG/1
20 CAPSULE, DELAYED RELEASE ORAL DAILY
Qty: 30 CAP | Refills: 2 | Status: SHIPPED | OUTPATIENT
Start: 2019-01-25 | End: 2019-03-18 | Stop reason: SDUPTHER

## 2019-01-25 NOTE — PATIENT INSTRUCTIONS
To Do: 
· In order to maintain your vitamin D level in the normal range,  I recommend that you take at least 6000 units of Vitamin D3 over-the-counter on a daily basis. ·  to try to get a better control of your nerve pain, add the cymbalta 20mg daily to your regimen. Try taking this at night before bed since sometimes it can make people sleepy in the beginning, but as you take it regularly, it gets better. · Be sure to take your gabapentin 900mg 3 times a day on a regular basis. .. Because it works better when you take it regularly! Notes from your doctor: · I'm going to work on getting you to a neurologist to reassess your nerve function in your left leg, to ensure that we aren't missing anything that could be worsening your pain

## 2019-01-25 NOTE — PROGRESS NOTES
Sara Bingham is a 41 y.o. male (: 1980) presenting to address: Chief Complaint Patient presents with  Complete Physical  
 Leg Pain  
  left leg pain Vitals:  
 19 1041 BP: 124/81 Pulse: 84 Resp: 18 Temp: 97.7 °F (36.5 °C) TempSrc: Oral  
SpO2: 100% Weight: 137 lb 9.6 oz (62.4 kg) Height: 5' 6\" (1.676 m) PainSc:   8 PainLoc: Leg Hearing/Vision: No exam data present Learning Assessment:  
 
Learning Assessment 2013 PRIMARY LEARNER Patient HIGHEST LEVEL OF EDUCATION - PRIMARY LEARNER  SOME COLLEGE  
BARRIERS PRIMARY LEARNER NONE PRIMARY LANGUAGE ENGLISH  NEED No  
LEARNER PREFERENCE PRIMARY VIDEOS  
  LISTENING  
ANSWERED BY Patient RELATIONSHIP SELF Depression Screening: PHQ over the last two weeks 2019 PHQ Not Done Active Diagnosis of Depression or Bipolar Disorder Little interest or pleasure in doing things Not at all Feeling down, depressed, irritable, or hopeless Not at all Total Score PHQ 2 0 Trouble falling or staying asleep, or sleeping too much - Feeling tired or having little energy - Poor appetite, weight loss, or overeating - Feeling bad about yourself - or that you are a failure or have let yourself or your family down - Trouble concentrating on things such as school, work, reading, or watching TV - Moving or speaking so slowly that other people could have noticed; or the opposite being so fidgety that others notice - Thoughts of being better off dead, or hurting yourself in some way -  
PHQ 9 Score - How difficult have these problems made it for you to do your work, take care of your home and get along with others - Fall Risk Assessment:  
No flowsheet data found. Abuse Screening:  
 
Abuse Screening Questionnaire 2015 Do you ever feel afraid of your partner? Clearnce Beams Are you in a relationship with someone who physically or mentally threatens you?  Clearnce Beams  
 Is it safe for you to go home? Jane Yarbrough Coordination of Care Questionaire: 1. Have you been to the ER, urgent care clinic since your last visit? Hospitalized since your last visit? NO 
 
2. Have you seen or consulted any other health care providers outside of the [address] [place] [address] since your last visit? Include any pap smears or colon screening. NO Advanced Directive: 1. Do you have an Advanced Directive? NO 
 
2. Would you like information on Advanced Directives?  NO

## 2019-01-29 LAB
C TRACH RRNA SPEC QL NAA+PROBE: NEGATIVE
N GONORRHOEA RRNA SPEC QL NAA+PROBE: NEGATIVE
T VAGINALIS RRNA VAG QL NAA+PROBE: NEGATIVE

## 2019-03-18 DIAGNOSIS — M79.2 NEUROPATHIC PAIN, LEG, LEFT: ICD-10-CM

## 2019-03-18 RX ORDER — DULOXETIN HYDROCHLORIDE 20 MG/1
20 CAPSULE, DELAYED RELEASE ORAL DAILY
Qty: 90 CAP | Refills: 1 | Status: SHIPPED | OUTPATIENT
Start: 2019-03-18 | End: 2019-11-27

## 2019-04-17 ENCOUNTER — HOSPITAL ENCOUNTER (OUTPATIENT)
Dept: LAB | Age: 39
Discharge: HOME | End: 2019-04-17
Payer: COMMERCIAL

## 2019-04-17 ENCOUNTER — OFFICE VISIT (OUTPATIENT)
Dept: FAMILY MEDICINE CLINIC | Age: 39
End: 2019-04-17

## 2019-04-17 VITALS
WEIGHT: 139.4 LBS | BODY MASS INDEX: 22.4 KG/M2 | SYSTOLIC BLOOD PRESSURE: 120 MMHG | HEART RATE: 96 BPM | DIASTOLIC BLOOD PRESSURE: 70 MMHG | HEIGHT: 66 IN | RESPIRATION RATE: 14 BRPM | TEMPERATURE: 96.7 F | OXYGEN SATURATION: 96 %

## 2019-04-17 DIAGNOSIS — M21.372 FOOT DROP, LEFT: ICD-10-CM

## 2019-04-17 DIAGNOSIS — E55.9 VITAMIN D DEFICIENCY: ICD-10-CM

## 2019-04-17 DIAGNOSIS — M79.2 NEUROPATHIC PAIN, LEG, LEFT: ICD-10-CM

## 2019-04-17 DIAGNOSIS — Z11.3 SCREEN FOR STD (SEXUALLY TRANSMITTED DISEASE): ICD-10-CM

## 2019-04-17 DIAGNOSIS — G57.32 NEUROPATHY OF LEFT PERONEAL NERVE: ICD-10-CM

## 2019-04-17 DIAGNOSIS — R73.03 PREDIABETES: ICD-10-CM

## 2019-04-17 DIAGNOSIS — Z91.199 NONCOMPLIANCE: ICD-10-CM

## 2019-04-17 DIAGNOSIS — R36.9 ABNORMAL PENILE DISCHARGE: Primary | ICD-10-CM

## 2019-04-17 LAB
ALBUMIN SERPL-MCNC: 3.7 G/DL (ref 3.4–5)
ALBUMIN/GLOB SERPL: 1.1 {RATIO} (ref 0.8–1.7)
ALP SERPL-CCNC: 77 U/L (ref 45–117)
ALT SERPL-CCNC: 27 U/L (ref 16–61)
ANION GAP SERPL CALC-SCNC: 9 MMOL/L (ref 3–18)
AST SERPL-CCNC: 20 U/L (ref 15–37)
BILIRUB SERPL-MCNC: 0.6 MG/DL (ref 0.2–1)
BILIRUB UR QL STRIP: NEGATIVE
BUN SERPL-MCNC: 9 MG/DL (ref 7–18)
BUN/CREAT SERPL: 12 (ref 12–20)
CALCIUM SERPL-MCNC: 9.1 MG/DL (ref 8.5–10.1)
CHLORIDE SERPL-SCNC: 104 MMOL/L (ref 100–108)
CO2 SERPL-SCNC: 27 MMOL/L (ref 21–32)
CREAT SERPL-MCNC: 0.76 MG/DL (ref 0.6–1.3)
EST. AVERAGE GLUCOSE BLD GHB EST-MCNC: 114 MG/DL
FERRITIN SERPL-MCNC: 104 NG/ML (ref 8–388)
FOLATE SERPL-MCNC: 19.8 NG/ML (ref 3.1–17.5)
GLOBULIN SER CALC-MCNC: 3.4 G/DL (ref 2–4)
GLUCOSE SERPL-MCNC: 100 MG/DL (ref 74–99)
GLUCOSE UR-MCNC: NEGATIVE MG/DL
HBA1C MFR BLD: 5.6 % (ref 4.2–5.6)
IRON SATN MFR SERPL: 18 %
IRON SERPL-MCNC: 67 UG/DL (ref 50–175)
KETONES P FAST UR STRIP-MCNC: NEGATIVE MG/DL
PH UR STRIP: 6.5 [PH] (ref 4.6–8)
POTASSIUM SERPL-SCNC: 4.4 MMOL/L (ref 3.5–5.5)
PROT SERPL-MCNC: 7.1 G/DL (ref 6.4–8.2)
PROT UR QL STRIP: NORMAL
SODIUM SERPL-SCNC: 140 MMOL/L (ref 136–145)
SP GR UR STRIP: 1.02 (ref 1–1.03)
T4 FREE SERPL-MCNC: 0.7 NG/DL (ref 0.7–1.5)
TIBC SERPL-MCNC: 372 UG/DL (ref 250–450)
TSH SERPL DL<=0.05 MIU/L-ACNC: 0.47 UIU/ML (ref 0.36–3.74)
UA UROBILINOGEN AMB POC: NORMAL (ref 0.2–1)
URINALYSIS CLARITY POC: CLEAR
URINALYSIS COLOR POC: YELLOW
URINE BLOOD POC: NEGATIVE
URINE LEUKOCYTES POC: NEGATIVE
URINE NITRITES POC: NEGATIVE
VIT B12 SERPL-MCNC: 616 PG/ML (ref 211–911)

## 2019-04-17 PROCEDURE — 87389 HIV-1 AG W/HIV-1&-2 AB AG IA: CPT

## 2019-04-17 PROCEDURE — 82306 VITAMIN D 25 HYDROXY: CPT

## 2019-04-17 PROCEDURE — 86803 HEPATITIS C AB TEST: CPT

## 2019-04-17 PROCEDURE — 86780 TREPONEMA PALLIDUM: CPT

## 2019-04-17 PROCEDURE — 83540 ASSAY OF IRON: CPT

## 2019-04-17 PROCEDURE — 83036 HEMOGLOBIN GLYCOSYLATED A1C: CPT

## 2019-04-17 PROCEDURE — 84443 ASSAY THYROID STIM HORMONE: CPT

## 2019-04-17 PROCEDURE — 80053 COMPREHEN METABOLIC PANEL: CPT

## 2019-04-17 PROCEDURE — 87340 HEPATITIS B SURFACE AG IA: CPT

## 2019-04-17 PROCEDURE — 36415 COLL VENOUS BLD VENIPUNCTURE: CPT

## 2019-04-17 PROCEDURE — 82607 VITAMIN B-12: CPT

## 2019-04-17 PROCEDURE — 82728 ASSAY OF FERRITIN: CPT

## 2019-04-17 PROCEDURE — 84439 ASSAY OF FREE THYROXINE: CPT

## 2019-04-17 NOTE — PROGRESS NOTES
Tino Sam is a 41 y.o. male (: 1980) presenting to address: Chief Complaint Patient presents with  Leg Pain  Penile Discharge  
  clear x 1 week  Penis Pain  
  testicle pain x 1 week  Form Completion [facility] Vitals:  
 19 1013 BP: 120/70 Pulse: 96  
Resp: 14 Temp: 96.7 °F (35.9 °C) TempSrc: Oral  
SpO2: 96% Weight: 139 lb 6.4 oz (63.2 kg) Height: 5' 6\" (1.676 m) PainSc:   8 PainLoc: Leg Hearing/Vision: No exam data present Learning Assessment:  
 
Learning Assessment 2013 PRIMARY LEARNER Patient HIGHEST LEVEL OF EDUCATION - PRIMARY LEARNER  SOME COLLEGE  
BARRIERS PRIMARY LEARNER NONE PRIMARY LANGUAGE ENGLISH  NEED No  
LEARNER PREFERENCE PRIMARY VIDEOS  
  LISTENING  
ANSWERED BY Patient RELATIONSHIP SELF Depression Screening:  
 
3 most recent PHQ Screens 2019 PHQ Not Done - Little interest or pleasure in doing things Not at all Feeling down, depressed, irritable, or hopeless Not at all Total Score PHQ 2 0 Trouble falling or staying asleep, or sleeping too much - Feeling tired or having little energy - Poor appetite, weight loss, or overeating - Feeling bad about yourself - or that you are a failure or have let yourself or your family down - Trouble concentrating on things such as school, work, reading, or watching TV - Moving or speaking so slowly that other people could have noticed; or the opposite being so fidgety that others notice - Thoughts of being better off dead, or hurting yourself in some way -  
PHQ 9 Score - How difficult have these problems made it for you to do your work, take care of your home and get along with others - Fall Risk Assessment:  
 
Fall Risk Assessment, last 12 mths 2019 Able to walk? Yes Fall in past 12 months? Yes Fall with injury? Yes  
Number of falls in past 12 months 2 Fall Risk Score 3 Abuse Screening: Abuse Screening Questionnaire 4/17/2019 Do you ever feel afraid of your partner? Nancy Sandoval Are you in a relationship with someone who physically or mentally threatens you? Nancy Sandoval Is it safe for you to go home? Adama Marx Coordination of Care Questionaire: 1. Have you been to the ER, urgent care clinic since your last visit? Hospitalized since your last visit? YES [facility] 4/1/19 Leg pain 2. Have you seen or consulted any other health care providers outside of the [address] [place] [address] since your last visit? Include any pap smears or colon screening. NO Advanced Directive: 1. Do you have an Advanced Directive? NO 
 
2. Would you like information on Advanced Directives?  NO

## 2019-04-17 NOTE — PATIENT INSTRUCTIONS
To Do: ·  let the  know you need your bloodwork on your way out · Do better with re: to taking your cymbalta on a daily basis, and your gabapentin 3 times a day. The medication does not work if you do not take it. · KEEP YOUR APPOINTMENT WITH NEUROLOGY. If you cannot keep your appointment, call them ahead of time to reschedule. Otherwise, they may dismiss you from their practice (and there are not many other neurologists in the area that can care for your issue). May 24, 2019 @ 9:15 am w/ Nurse Practitioner Aniyah Elizalde [facility] Neurology [address] [facility] [facility] [address]  
[phone] Notes from your doctor: · If you want me to complete [facility] paperwork, please bring it by the office, and include a copy of your job description. Learning About Safer Sex What is safer sex? Safer sex is a way to help you avoid an infection spread through sex. It can also help prevent pregnancy. It may seems strange or uncomfortable to talk about sex. But the more you know, the safer you are. And the actions you take before sex can help keep you from getting an infection like herpes or a deadly infection like HIV. You can get a sexually transmitted infection (STI) from any kind of sexual contact, not just intercourse. STIs are spread through skin-to-skin contact between the genitals. You can also get an STI from contact with body fluids such as semen, vaginal fluids, and blood (including menstrual blood). This means you can get an STI from vaginal sex, anal sex, or oral sex. You may have heard this before, but not having sex at all is still the best way to prevent pregnancy and any STI. How can you protect yourself from STIs? A condom is one of the best ways to lower your chance of STIs. You may know about condoms for men. Did you know there are condoms for women too?  The female condom is a tube of soft plastic with a closed end that is put deep into the vagina. · Use condoms or female condoms each time and every time you have sex. ? Condoms come in several sizes. Make sure you use the right size. A condom that is too small can break easily. A condom that is too big can slip off during sex. Use a new condom each time you have sex. ? Be careful not to poke a hole in the condom when you open the wrapper. ? Never use petroleum jelly (such as Vaseline), grease, hand lotion, baby oil, or anything with oil in it. These products can make holes in the condom. ? After sex, hold the condom on your penis as you remove your penis from your partner. This will keep semen from spilling out of the condom. · Do not use a female condom and male condom at the same time. · Do not have sex with anyone who has symptoms of an STI, such as sores on the genitals or mouth. The herpes virus that causes cold sores can spread to and from the penis and vagina. · Think about getting shots to prevent hepatitis A and hepatitis B, if you haven't already had these shots. You can get both of these diseases through sex. A dental dam is a special rubber sheet that you can use for protection during oral sex. How can you prevent pregnancy? Remember that birth control methods such as diaphragms, IUDs, foams, and birth control pills do not stop you from getting STIs. These are some safer sex things you can do to help avoid pregnancy: · Use some type of birth control every time you have sex. · Don't drink a lot of alcohol or use drugs before sex. This can cause you to let down your guard. And then you're not thinking clearly about safer sex. How else can you take care of yourself? · Talk to your partner before you have sex. Find out if he or she has or is at risk for any STI. Keep in mind that a person may be able to spread an STI even if he or she does not have symptoms. You and your partner may want to get an HIV test. You should get tested again 6 months later. · You should never feel pressured to have sex. It's okay to say \"no\" anytime you want to stop. · It's important to feel safe with your sex partner and with the activities you are doing together. If you don't feel safe, talk with an adult you trust. 
Follow-up care is a key part of your treatment and safety. Be sure to make and go to all appointments, and call your doctor if you are having problems. It's also a good idea to know your test results and keep a list of the medicines you take. Where can you learn more? Go to http://tonaDRO Biosystemsjose.info/. Enter P218 in the search box to learn more about \"Learning About Safer Sex for Teens. \" Current as of: September 11, 2018 Content Version: 11.9 © 4712-3453 [employer]. Care instructions adapted under license by [employer] (which disclaims liability or warranty for this information). If you have questions about a medical condition or this instruction, always ask your healthcare professional. [place] [address] any warranty or liability for your use of this information. Neuropathic Pain: Care Instructions Your Care Instructions Neuropathic pain is caused by pressure on or damage to your nerves. It's often simply called nerve pain. Some people feel this type of pain all the time. For others, it comes and goes. Diabetes, shingles, or an injury can cause nerve pain. Many people say the pain feels sharp, burning, or stabbing. But some people feel it as a dull ache. In some cases, it makes your skin very sensitive. So touch, pressure, and other sensations that did not hurt before may now cause pain. It's important to know that this kind of pain is real and can affect your quality of life. It's also important to know that treatment can help. Treatment includes pain medicines, exercise, and physical therapy.  
Medicines can help reduce the number of pain signals that travel over the nerves. This can make the painful areas less sensitive. It can also help you sleep better and improve your mood. But medicines are only one part of successful treatment. Most people do best with more than one kind of treatment. Your doctor may recommend that you try cognitive-behavioral therapy and stress management. Or, if needed, you may decide to try to quit smoking, lower your blood pressure, or better control blood sugar. These kinds of healthy changes can also make a difference. If you feel that your treatment is not working, talk to your doctor. And be sure to tell your doctor if you think you might be depressed or anxious. These are common problems that can also be treated. Follow-up care is a key part of your treatment and safety. Be sure to make and go to all appointments, and call your doctor if you are having problems. It's also a good idea to know your test results and keep a list of the medicines you take. How can you care for yourself at home? · Be safe with medicines. Read and follow all instructions on the label. ? If the doctor gave you a prescription medicine for pain, take it as prescribed. ? If you are not taking a prescription pain medicine, ask your doctor if you can take an over-the-counter medicine. · Save hard tasks for days when you have less pain. Follow a hard task with an easy task. And remember to take breaks. · Relax, and reduce stress. You may want to try deep breathing or meditation. These can help. · Keep moving. Gentle, daily exercise can help reduce pain. Your doctor or physical therapist can tell you what type of exercise is best for you. This may include walking, swimming, and stationary biking. It may also include stretches and range-of-motion exercises. · Try heat, cold packs, and massage. · Get enough sleep. Constant pain can make you more tired. If the pain makes it hard to sleep, talk with your doctor. · Think positively. Your thoughts can affect your pain. Do fun things to distract yourself from the pain. See a movie, read a book, listen to music, or spend time with a friend. · Keep a pain diary. Try to write down how strong your pain is and what it feels like. Also try to notice and write down how your moods, thoughts, sleep, activities, and medicine affect your pain. These notes can help you and your doctor find the best ways to treat your pain. Reducing constipation caused by pain medicine Pain medicines often cause constipation. To reduce constipation: 
· Include fruits, vegetables, beans, and whole grains in your diet each day. These foods are high in fiber. · Drink plenty of fluids, enough so that your urine is light yellow or clear like water. If you have kidney, heart, or liver disease and have to limit fluids, talk with your doctor before you increase the amount of fluids you drink. · Get some exercise every day. Build up slowly to 30 to 60 minutes a day on 5 or more days of the week. · Take a fiber supplement, such as Citrucel or Metamucil, every day if needed. Read and follow all instructions on the label. · Schedule time each day for a bowel movement. Having a daily routine may help. Take your time and do not strain when having a bowel movement. · Ask your doctor about a laxative. The goal is to have one easy bowel movement every 1 to 2 days. Do not let constipation go untreated for more than 3 days. When should you call for help? Call your doctor now or seek immediate medical care if: 
  · You feel sad, anxious, or hopeless for more than a few days. This could mean you are depressed. Depression is common in people who have a lot of pain. But it can be treated.  
  · You have trouble with bowel movements, such as: 
? No bowel movement in 3 days. ? Blood in the anal area, in your stool, or on the toilet paper. ? Diarrhea for more than 24 hours.  Watch closely for changes in your health, and be sure to contact your doctor if: 
  · Your pain is getting worse.  
  · You can't sleep because of pain.  
  · You are very worried or anxious about your pain.  
  · You have trouble taking your pain medicine.  
  · You have any concerns about your pain medicine or its side effects.  
  · You have vomiting or cramps for more than 2 hours. Where can you learn more? Go to http://tona-jose.info/. Enter Q413 in the search box to learn more about \"Neuropathic Pain: Care Instructions. \" Current as of: Judit 3, 2018 Content Version: 11.9 © 7479-1278 [employer]. Care instructions adapted under license by [employer] (which disclaims liability or warranty for this information). If you have questions about a medical condition or this instruction, always ask your healthcare professional. [address] any warranty or liability for your use of this information.

## 2019-04-18 DIAGNOSIS — E55.9 VITAMIN D DEFICIENCY: ICD-10-CM

## 2019-04-18 LAB
25(OH)D3 SERPL-MCNC: 10.7 NG/ML (ref 30–100)
HBV SURFACE AG SER QL: 0.22 INDEX
HBV SURFACE AG SER QL: NEGATIVE
HCV AB SER IA-ACNC: <0.02 INDEX
HCV AB SERPL QL IA: NEGATIVE
HCV COMMENT,HCGAC: NORMAL
HIV 1+2 AB+HIV1 P24 AG SERPL QL IA: NONREACTIVE
HIV12 RESULT COMMENT, HHIVC: NORMAL
T PALLIDUM AB SER QL IA: NONREACTIVE

## 2019-04-18 RX ORDER — ERGOCALCIFEROL 1.25 MG/1
50000 CAPSULE ORAL
Qty: 24 CAP | Refills: 0 | Status: SHIPPED | OUTPATIENT
Start: 2019-04-19 | End: 2019-07-18 | Stop reason: SDUPTHER

## 2019-04-18 NOTE — PROGRESS NOTES
Please call Arely Cameron. bloodwork results back - negative for HIV, HepC, syphilis. Still waiting on results for urine STD testing, so we do not know the results for gonorrhea/chlamydia, etc yet. Also his Vitamin D remains quite low, and he needs to be better about taking his twice weekly Vitamin D, as having low vitamin D can worsen chronic pain. A refill was sent to his pharmacy. The rest of his bloodwork was okay. Will notify him once we have the urine results. Thanks.

## 2019-04-19 NOTE — PROGRESS NOTES
Spoke with patient regarding lab results; negative for HIV, HepC, syphilis; urine results are not back yet, will call patient when they are completed; vit d level is still and can worsen chronic pain; Rx sent to pharmacy, patient will ; all other labs are good.

## 2019-04-22 LAB
APPEARANCE UR: CLEAR
BACTERIA UR CULT: ABNORMAL
BILIRUB UR QL STRIP: NEGATIVE
C TRACH RRNA SPEC QL NAA+PROBE: NEGATIVE
COLOR UR: YELLOW
GLUCOSE UR QL: NEGATIVE
HGB UR QL STRIP: NEGATIVE
KETONES UR QL STRIP: NEGATIVE
LEUKOCYTE ESTERASE UR QL STRIP: NEGATIVE
MICRO URNS: NORMAL
N GONORRHOEA RRNA SPEC QL NAA+PROBE: NEGATIVE
NITRITE UR QL STRIP: NEGATIVE
PH UR STRIP: 6.5 [PH] (ref 5–7.5)
PROT UR QL STRIP: NORMAL
SP GR UR: 1.02 (ref 1–1.03)
T VAGINALIS RRNA VAG QL NAA+PROBE: NEGATIVE
UROBILINOGEN UR STRIP-MCNC: 0.2 MG/DL (ref 0.2–1)

## 2019-04-23 NOTE — PROGRESS NOTES
Please call Kelle Julien. Urine was negative for gonorrhea, chlamydia and trichomonas. However was positive for a possible urinary infection. Is he still having symptoms? If so, will send an antibiotic to his pharmacy accordingly. Thanks.

## 2019-04-24 RX ORDER — SULFAMETHOXAZOLE AND TRIMETHOPRIM 800; 160 MG/1; MG/1
1 TABLET ORAL 2 TIMES DAILY
Qty: 10 TAB | Refills: 0 | Status: SHIPPED | OUTPATIENT
Start: 2019-04-24 | End: 2019-04-29

## 2019-04-24 NOTE — PROGRESS NOTES
Patient informed he states he is still having pain and discharge informed him we would send antibiotic to pharmacy on file.

## 2019-05-29 ENCOUNTER — HOSPITAL ENCOUNTER (OUTPATIENT)
Dept: LAB | Age: 39
Discharge: HOME | End: 2019-05-29
Payer: COMMERCIAL

## 2019-05-29 ENCOUNTER — OFFICE VISIT (OUTPATIENT)
Dept: FAMILY MEDICINE CLINIC | Age: 39
End: 2019-05-29

## 2019-05-29 VITALS
RESPIRATION RATE: 16 BRPM | DIASTOLIC BLOOD PRESSURE: 85 MMHG | OXYGEN SATURATION: 97 % | WEIGHT: 135 LBS | BODY MASS INDEX: 21.69 KG/M2 | TEMPERATURE: 97.6 F | HEIGHT: 66 IN | HEART RATE: 89 BPM | SYSTOLIC BLOOD PRESSURE: 123 MMHG

## 2019-05-29 DIAGNOSIS — Z11.3 SCREENING FOR STD (SEXUALLY TRANSMITTED DISEASE): Primary | ICD-10-CM

## 2019-05-29 DIAGNOSIS — R36.9 PENILE DISCHARGE: ICD-10-CM

## 2019-05-29 DIAGNOSIS — Z11.3 SCREENING FOR STD (SEXUALLY TRANSMITTED DISEASE): ICD-10-CM

## 2019-05-29 LAB
BILIRUB UR QL STRIP: NORMAL
GLUCOSE UR-MCNC: NEGATIVE MG/DL
KETONES P FAST UR STRIP-MCNC: NORMAL MG/DL
PH UR STRIP: 5.5 [PH] (ref 4.6–8)
PROT UR QL STRIP: NORMAL
SP GR UR STRIP: 1.02 (ref 1–1.03)
UA UROBILINOGEN AMB POC: NORMAL (ref 0.2–1)
URINALYSIS CLARITY POC: CLEAR
URINALYSIS COLOR POC: YELLOW
URINE BLOOD POC: NORMAL
URINE LEUKOCYTES POC: NORMAL
URINE NITRITES POC: NEGATIVE

## 2019-05-29 PROCEDURE — 87491 CHLMYD TRACH DNA AMP PROBE: CPT

## 2019-05-29 PROCEDURE — 87086 URINE CULTURE/COLONY COUNT: CPT

## 2019-05-29 RX ORDER — CIPROFLOXACIN 500 MG/1
500 TABLET ORAL 2 TIMES DAILY
Qty: 14 TAB | Refills: 0 | Status: SHIPPED | OUTPATIENT
Start: 2019-05-29 | End: 2019-06-05

## 2019-05-29 NOTE — PROGRESS NOTES
Subjective:     Andi Garza is a 41 y.o. male who presents with concerns about sexually transmitted disease. Current symptoms are: urethral discharge: yellow. Onset of symptoms was gradual, and has been gradually worsening since that time. Sexual history reviewed with the patient. STD exposure: multiple sexual partners. Previous STD history: yes  HIV Status: the patient is HIV negative. Contraception: none    Current Outpatient Medications   Medication Sig Dispense Refill    ciprofloxacin HCl (CIPRO) 500 mg tablet Take 1 Tab by mouth two (2) times a day for 7 days. 14 Tab 0    ergocalciferol (ERGOCALCIFEROL) 50,000 unit capsule Take 1 Cap by mouth every Tuesday and Friday for 24 doses. 24 Cap 0    DULoxetine (CYMBALTA) 20 mg capsule Take 1 Cap by mouth daily. 90 Cap 1    gabapentin (NEURONTIN) 300 mg capsule Take 3 Caps by mouth three (3) times daily. 270 Cap 3    fexofenadine (ALLEGRA) 60 mg tablet Take 1 Tab by mouth daily as needed for Allergies. 90 Tab 1    fluticasone (FLONASE) 50 mcg/actuation nasal spray 2 Sprays by Both Nostrils route daily. 1 Bottle 1     Allergies   Allergen Reactions    Pcn [Penicillins] Other (comments)        Review of Systems  Pertinent items are noted in HPI. Physical Exam     Visit Vitals  /85 (BP 1 Location: Right arm, BP Patient Position: Sitting)   Pulse 89   Temp 97.6 °F (36.4 °C) (Oral)   Resp 16   Ht 5' 6\" (1.676 m)   Wt 135 lb (61.2 kg)   SpO2 97%   BMI 21.79 kg/m²     General appearance: alert, cooperative, no distress, appears stated age  Abdomen: soft, non-tender. Bowel sounds normal. No masses,  no organomegaly  Male genitalia: abnormal findings: urethral discharge  urine + abnormal findings documented. Assessment/Plan:     Encounter Diagnoses   Name Primary?     Screening for STD (sexually transmitted disease) Yes    Penile discharge      Orders Placed This Encounter    CULTURE, URINE    CT/NG/T.VAGINALIS AMPLIFICATION    AMB POC URINALYSIS DIP STICK AUTO W/O MICRO    ciprofloxacin HCl (CIPRO) 500 mg tablet   . start med for UTI will call with remainder results and plan.

## 2019-05-29 NOTE — PROGRESS NOTES
Lyly Peralta is a 41 y.o. male (: 1980) presenting to address:    Chief Complaint   Patient presents with    Penile Discharge    Penis Pain     pain scale 0/10       Vitals:    19 0946   BP: 123/85   Pulse: 89   Resp: 16   Temp: 97.6 °F (36.4 °C)   TempSrc: Oral   SpO2: 97%   Weight: 135 lb (61.2 kg)   Height: 5' 6\" (1.676 m)   PainSc:   0 - No pain       Hearing/Vision:   No exam data present    Learning Assessment:     Learning Assessment 2013   PRIMARY LEARNER Patient   HIGHEST LEVEL OF EDUCATION - PRIMARY LEARNER  SOME COLLEGE   BARRIERS PRIMARY LEARNER NONE   PRIMARY LANGUAGE ENGLISH    NEED No   LEARNER PREFERENCE PRIMARY VIDEOS     LISTENING   ANSWERED BY Patient   RELATIONSHIP SELF     Depression Screening:     3 most recent PHQ Screens 2019   PHQ Not Done -   Little interest or pleasure in doing things Not at all   Feeling down, depressed, irritable, or hopeless Not at all   Total Score PHQ 2 0   Trouble falling or staying asleep, or sleeping too much -   Feeling tired or having little energy -   Poor appetite, weight loss, or overeating -   Feeling bad about yourself - or that you are a failure or have let yourself or your family down -   Trouble concentrating on things such as school, work, reading, or watching TV -   Moving or speaking so slowly that other people could have noticed; or the opposite being so fidgety that others notice -   Thoughts of being better off dead, or hurting yourself in some way -   PHQ 9 Score -   How difficult have these problems made it for you to do your work, take care of your home and get along with others -     Fall Risk Assessment:     Fall Risk Assessment, last 12 mths 2019   Able to walk? Yes   Fall in past 12 months? Yes   Fall with injury? Yes   Number of falls in past 12 months 2   Fall Risk Score 3     Abuse Screening:     Abuse Screening Questionnaire 2019   Do you ever feel afraid of your partner?  N   Are you in a relationship with someone who physically or mentally threatens you? N   Is it safe for you to go home? Y     Coordination of Care Questionaire:   1. Have you been to the ER, urgent care clinic since your last visit? Hospitalized since your last visit? NO    2. Have you seen or consulted any other health care providers outside of the [address] [place] [address] since your last visit? Include any pap smears or colon screening. NO    Advanced Directive:   1. Do you have an Advanced Directive? NO    2. Would you like information on Advanced Directives?  NO

## 2019-05-29 NOTE — PATIENT INSTRUCTIONS
Exposure to Sexually Transmitted Infections: Care Instructions  Your Care Instructions  Sexually transmitted infections (STIs) are those diseases spread by sexual contact. There are at least 20 different STIs, including chlamydia, gonorrhea, syphilis, and human immunodeficiency virus (HIV), which causes AIDS. Bacteria-caused STIs can be treated and cured. STIs caused by viruses, such as HIV, can be treated but not cured. Some STIs can reduce a woman's chances of getting pregnant in the future. STIs are spread during sexual contact, such as vaginal intercourse and oral or anal sex. Follow-up care is a key part of your treatment and safety. Be sure to make and go to all appointments, and call your doctor if you are having problems. It's also a good idea to know your test results and keep a list of the medicines you take. How can you care for yourself at home? · Your doctor may have given you a shot of antibiotics. If your doctor prescribed antibiotic pills, take them as directed. Do not stop taking them just because you feel better. You need to take the full course of antibiotics. · Do not have sexual contact while you have symptoms of an STI or are being treated for an STI. · Tell your sex partner (or partners) that he or she will need treatment. · If you are a woman, do not douche. Douching changes the normal balance of bacteria in the vagina and may spread an infection up into your reproductive organs. To prevent exposure to STIs in the future  · Use latex condoms every time you have sex. Use them from the beginning to the end of sexual contact. · Talk to your partner before you have sex. Find out if he or she has or is at risk for any STI. Keep in mind that a person may be able to spread an STI even if he or she does not have symptoms. · Do not have sex if you are being treated for an STI. · Do not have sex with anyone who has symptoms of an STI, such as sores on the genitals or mouth.   · Having one sex partner (who does not have STIs and does not have sex with anyone else) is a good way to avoid STIs. When should you call for help? Call your doctor now or seek immediate medical care if:    · You have new pain in your belly or pelvis.     · You have symptoms of a urinary tract infection. These may include:  ? Pain or burning when you urinate. ? A frequent need to urinate without being able to pass much urine. ? Pain in the flank, which is just below the rib cage and above the waist on either side of the back. ? Blood in your urine. ? A fever.     · You have new or worsening pain or swelling in the scrotum.    Watch closely for changes in your health, and be sure to contact your doctor if:    · You have unusual vaginal bleeding.     · You have a discharge from the vagina or penis.     · You have any new symptoms, such as sores, bumps, rashes, blisters, or warts.     · You have itching, tingling, pain, or burning in the genital or anal area.     · You think you may have an STI. Where can you learn more? Go to http://tona-jose.info/. Enter Z491 in the search box to learn more about \"Exposure to Sexually Transmitted Infections: Care Instructions. \"  Current as of: September 11, 2018  Content Version: 11.9  © 2112-6049 [employer]. Care instructions adapted under license by [employer] (which disclaims liability or warranty for this information). If you have questions about a medical condition or this instruction, always ask your healthcare professional. [address] any warranty or liability for your use of this information.

## 2019-05-31 LAB
BACTERIA SPEC CULT: NORMAL
SERVICE CMNT-IMP: NORMAL

## 2019-06-02 LAB
C TRACH RRNA SPEC QL NAA+PROBE: NEGATIVE
N GONORRHOEA RRNA SPEC QL NAA+PROBE: NEGATIVE
SPECIMEN SOURCE: NORMAL
T VAGINALIS RRNA VAG QL NAA+PROBE: NEGATIVE

## 2019-06-06 ENCOUNTER — TELEPHONE (OUTPATIENT)
Dept: FAMILY MEDICINE CLINIC | Age: 39
End: 2019-06-06

## 2019-07-18 DIAGNOSIS — E55.9 VITAMIN D DEFICIENCY: ICD-10-CM

## 2019-07-19 RX ORDER — ERGOCALCIFEROL 1.25 MG/1
50000 CAPSULE ORAL
Qty: 24 CAP | Refills: 0 | Status: SHIPPED | OUTPATIENT
Start: 2019-07-19 | End: 2019-10-13 | Stop reason: SDUPTHER

## 2019-10-13 DIAGNOSIS — E55.9 VITAMIN D DEFICIENCY: ICD-10-CM

## 2019-10-15 RX ORDER — ERGOCALCIFEROL 1.25 MG/1
50000 CAPSULE ORAL
Qty: 24 CAP | Refills: 0 | Status: SHIPPED | OUTPATIENT
Start: 2019-10-15 | End: 2020-01-13

## 2019-11-26 NOTE — PROGRESS NOTES
[address] [facility]  Primary Care Office Visit - Complete Physical    Jennifer Richards is a 46 y.o. male presenting for annual physical.  : 1980        Assessment/Plan:       ICD-10-CM ICD-9-CM   1. Routine adult health maintenance Z00.00 V70.0   2. Neuropathy of left peroneal nerve G57.32 355.3   3. Noncompliance Z91.19 V15.81   4. Vitamin D deficiency E55.9 268.9     Declined Tdap & influenza vaccine. Routine bloodwork done <1yr ago, only significant for VitD def, of which he states he has been \"somewhat\" compliant with repletion. Noncompliant with recommendations from neurology re: neuropathy. Recommended that he improves upon compliance and follow-up with neurology as instructed. Orders Placed This Encounter    pregabalin (LYRICA) 100 mg capsule     Sig: Take 1 Cap by mouth three (3) times daily. Max Daily Amount: 300 mg. Dispense:  270 Cap     Refill:  1         Management plan & patient instructions reviewed with Jennifer Richards, who voiced understanding. This document may have been created with the aid of dictation software. Text may contain errors, particularly phonetic errors. Neisha Gonzalez MD  Internal Medicine, Family Medicine & Sports Medicine  2019         Subjective   History:   Jennifer Richards is a 46 y.o. male presenting for an annual physical.    Complains of ongoing uncontrolled neuropathy. States that the lyrica doesn't work, and hasn't been taking it. Unfortunately, never returned to neurology for follow-up. Has never attended the PT that they ordered. Wants [facility] paperwork to be completed, as he mentioned before, however failed to bring in the paperwork nor the job description as instructed during his last visit in the office. Does report he is taking his vitamin D \"usually\".     Past Medical History:   Diagnosis Date    Asthma     Narcotic abuse ([facility] [address] 10/13/2014    Per ER notes, using narcotic that he obtained off the streets      Past Surgical History:   Procedure Laterality Date    HX ORTHOPAEDIC  July 2013    ORIF L fibular head fracture      reports that he has never smoked. He has never used smokeless tobacco. He reports current alcohol use of about 5.0 standard drinks of alcohol per week. He reports current drug use. Drug: Marijuana. Social History     Patient does not qualify to have social determinant information on file (likely too young). Social History Narrative    LHD    . 2 children. 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    (1/22/2015)     Social History     Tobacco Use   Smoking Status Never Smoker   Smokeless Tobacco Never Used     Family History   Problem Relation Age of Onset    Diabetes Mother     Cancer Maternal Grandmother         breast    Cancer Maternal Grandfather         prostate     Allergies   Allergen Reactions    Pcn [Penicillins] Other (comments)       Problem List:      Patient Active Problem List    Diagnosis    Noncompliance    Penile discharge, without blood    Vitamin D deficiency     - VitD 6.3 (L) (2/6/2016) -> 50k 2x/wk x12wks, then 4000 units OTC daily      Prediabetes     - A1c 5.8 (Feb 2016)      Neuropathy of left peroneal nerve     - 2/25/2015 [[facility]]: schedule LLE NCS  - 1/28/2015 [[facility] PM&R]: butrans patch 10mcg weekly; continue gabapentin 1200mg TID & elavil 50mg qhs; start NMES unit for left leg atrophy    - EMG (12/2013): severe profound peroneal neuropathy at the level of the fibular head (short head of the biceps was spared). The lack of reinnervation potentials at this point portends a poor prognosis.  Foot drop, left    Fracture, fibula, proximal       Medications:     Current Outpatient Medications   Medication Sig    pregabalin (LYRICA) 100 mg capsule Take 1 Cap by mouth three (3) times daily. Max Daily Amount: 300 mg.  ergocalciferol (ERGOCALCIFEROL) 50,000 unit capsule TAKE 1 CAP BY MOUTH EVERY TUESDAY AND FRIDAY FOR 24 DOSES.     fexofenadine (ALLEGRA) 60 mg tablet Take 1 Tab by mouth daily as needed for Allergies.  fluticasone (FLONASE) 50 mcg/actuation nasal spray 2 Sprays by Both Nostrils route daily. No current facility-administered medications for this visit. Review of Systems:     Review of Systems   Constitutional: Negative for chills and fever. HENT: Negative for congestion, ear pain and sore throat. Respiratory: Negative for cough and shortness of breath. Cardiovascular: Negative for chest pain and palpitations. Gastrointestinal: Negative for abdominal pain. Genitourinary: Negative for dysuria. Musculoskeletal: Negative for myalgias. Skin: Negative for rash. Neurological: Positive for tingling, sensory change and focal weakness. Negative for speech change and headaches. Endo/Heme/Allergies: Does not bruise/bleed easily. Psychiatric/Behavioral: Negative for depression. The patient is not nervous/anxious and does not have insomnia. Objective   Physical Assessment:   VS:    Visit Vitals  /74 (BP 1 Location: Left arm, BP Patient Position: Sitting)   Pulse (P) 92   Temp (P) 98 °F (36.7 °C) (Oral)   Resp (P) 16   Ht 5' 6\" (1.676 m)   Wt 135 lb (61.2 kg)   SpO2 (P) 97%   BMI 21.79 kg/m²     Physical Exam  Nursing note reviewed. Constitutional:       General: He is not in acute distress. Appearance: He is well-developed. He is not diaphoretic. HENT:      Head: Normocephalic and atraumatic. Right Ear: External ear normal.      Left Ear: External ear normal.   Eyes:      Pupils: Pupils are equal, round, and reactive to light. Neck:      Musculoskeletal: Normal range of motion and neck supple. Cardiovascular:      Rate and Rhythm: Normal rate and regular rhythm. Heart sounds: Normal heart sounds. No murmur. Pulmonary:      Effort: Pulmonary effort is normal. No respiratory distress. Breath sounds: Normal breath sounds. No wheezing.    Musculoskeletal: Normal range of motion. Skin:     General: Skin is warm and dry. Neurological:      Mental Status: He is alert and oriented to person, place, and time. Cranial Nerves: No cranial nerve deficit. Sensory: Sensory deficit present. Motor: Atrophy present. Comments: (+) left peroneal hypesthesia below knee  4/5 L ankle dorsiflexion with pain   Psychiatric:         Behavior: Behavior normal.         Thought Content: Thought content normal.         Judgment: Judgment normal.         Records Review:     Neuro note (5/24/2019):  Assessment   1. Chronic LLE pain, weakness, paresthesia and left foot drop since a football injury in 2013 with subsequent surgeries including open reduction and internal fixation of the fibula with repair of the posterior lateral corner and exploration of the peroneal nerve and manipulation of the left knee. He has had a recent flare of symptoms since a fall on his left leg while roller skating 4/2019. Recommendations   -Will place referral for PT for stretching and ROM exercises for LLE pain and weakness.   -For paresthesia and hypersensitivity of LLE, will trial course of lyrica 100 mg three times per day and stop gabapentin.   -Continue use of left AFO on a regular basis.   -Discussed referral to pain management for chronic LLE pain and agreed to hold off for now. Will re-discuss at f/u. -Continue to monitor symptoms and call with any onset of increased weakness or pain. -F/U 3 months.

## 2019-11-27 ENCOUNTER — OFFICE VISIT (OUTPATIENT)
Dept: FAMILY MEDICINE CLINIC | Age: 39
End: 2019-11-27

## 2019-11-27 VITALS
SYSTOLIC BLOOD PRESSURE: 128 MMHG | DIASTOLIC BLOOD PRESSURE: 74 MMHG | BODY MASS INDEX: 21.69 KG/M2 | WEIGHT: 135 LBS | HEIGHT: 66 IN

## 2019-11-27 DIAGNOSIS — Z91.199 NONCOMPLIANCE: ICD-10-CM

## 2019-11-27 DIAGNOSIS — E55.9 VITAMIN D DEFICIENCY: ICD-10-CM

## 2019-11-27 DIAGNOSIS — G57.32 NEUROPATHY OF LEFT PERONEAL NERVE: ICD-10-CM

## 2019-11-27 DIAGNOSIS — Z28.21 INFLUENZA VACCINATION DECLINED: ICD-10-CM

## 2019-11-27 DIAGNOSIS — Z00.00 ROUTINE ADULT HEALTH MAINTENANCE: Primary | ICD-10-CM

## 2019-11-27 RX ORDER — PREGABALIN 100 MG/1
100 CAPSULE ORAL 3 TIMES DAILY
COMMUNITY
Start: 2019-05-24 | End: 2019-11-27 | Stop reason: SDUPTHER

## 2019-11-27 RX ORDER — PREGABALIN 100 MG/1
100 CAPSULE ORAL 3 TIMES DAILY
Qty: 270 CAP | Refills: 1 | Status: SHIPPED | OUTPATIENT
Start: 2019-11-27

## 2019-11-27 NOTE — PATIENT INSTRUCTIONS
To Do: - keep going with your high dose vitamin D  - restart your lyrica 100mg three times a day  - call neurology and arrange for a follow-up appointment. Also ask them about the physical therapy that they requested. Notes from your doctor:  - You need to do better about taking your medications and following the recommendations from your physicians in order to get better. - if you want me to fill out [place] paperwork, you need to bring me your paperwork and a copy of your job description      TESTING RESULTS   Results will be released to [facility] Normal results will be sent via mail. If you have questions about your results, please schedule a follow up appointment to discuss with your PCP. MEDICATION REFILLS    Please allow at least 2 business days for refill requests to be addressed. Medication refills may be requested through your pharmacy. Refills will not be provided by the after hours/on call provider.

## 2019-11-27 NOTE — PROGRESS NOTES
Iona Schulte is a 46 y.o. male (: 1980) presenting to address:    Chief Complaint   Patient presents with    Leg Pain     left leg injury        Vitals:    19 1110   Weight: 135 lb (61.2 kg)   Height: 5' 6\" (1.676 m)   PainSc:   9   PainLoc: Leg       Hearing/Vision:   No exam data present    Learning Assessment:     Learning Assessment 2013   PRIMARY LEARNER Patient   HIGHEST LEVEL OF EDUCATION - PRIMARY LEARNER  SOME COLLEGE   BARRIERS PRIMARY LEARNER NONE   PRIMARY LANGUAGE ENGLISH    NEED No   LEARNER PREFERENCE PRIMARY VIDEOS     LISTENING   ANSWERED BY Patient   RELATIONSHIP SELF     Depression Screening:     3 most recent PHQ Screens 2019   PHQ Not Done -   Little interest or pleasure in doing things Not at all   Feeling down, depressed, irritable, or hopeless Not at all   Total Score PHQ 2 0   Trouble falling or staying asleep, or sleeping too much -   Feeling tired or having little energy -   Poor appetite, weight loss, or overeating -   Feeling bad about yourself - or that you are a failure or have let yourself or your family down -   Trouble concentrating on things such as school, work, reading, or watching TV -   Moving or speaking so slowly that other people could have noticed; or the opposite being so fidgety that others notice -   Thoughts of being better off dead, or hurting yourself in some way -   PHQ 9 Score -   How difficult have these problems made it for you to do your work, take care of your home and get along with others -     Fall Risk Assessment:     Fall Risk Assessment, last 12 mths 2019   Able to walk? Yes   Fall in past 12 months? Yes   Fall with injury? Yes   Number of falls in past 12 months 2   Fall Risk Score 3     Abuse Screening:     Abuse Screening Questionnaire 2019   Do you ever feel afraid of your partner? N   Are you in a relationship with someone who physically or mentally threatens you?  N   Is it safe for you to go home? Y     Coordination of Care Questionaire:   1. Have you been to the ER, urgent care clinic since your last visit? Hospitalized since your last visit? YES, [facility] for leg pain and asthma    2. Have you seen or consulted any other health care providers outside of the [address] [place] [address] since your last visit? Include any pap smears or colon screening. NO    Advanced Directive:   1. Do you have an Advanced Directive? NO    2. Would you like information on Advanced Directives? NO    Patient DECLINED flu vaccine.

## 2020-01-12 DIAGNOSIS — E55.9 VITAMIN D DEFICIENCY: ICD-10-CM

## 2020-01-13 RX ORDER — ERGOCALCIFEROL 1.25 MG/1
50000 CAPSULE ORAL
Qty: 24 CAP | Refills: 0 | Status: SHIPPED | OUTPATIENT
Start: 2020-01-14 | End: 2020-04-04

## 2020-02-25 ENCOUNTER — TELEPHONE (OUTPATIENT)
Dept: FAMILY MEDICINE CLINIC | Age: 40
End: 2020-02-25

## 2020-02-25 NOTE — TELEPHONE ENCOUNTER
Pt called to cancel appointment for today due to him having to attend a . I informed the pt that per florencio he isn't due for a physical yet however the patient complained of old leg pain still happening. I informed the patient that I would reach out to florencio to see if she can get him scheduled for a sports med visit. Please advise.

## 2022-08-08 NOTE — PROGRESS NOTES
Patient called and I made him aware of lab results and recommendations. Informed him that he need to come in ASAP for ABX injection. English